# Patient Record
Sex: MALE | Race: ASIAN | NOT HISPANIC OR LATINO | URBAN - METROPOLITAN AREA
[De-identification: names, ages, dates, MRNs, and addresses within clinical notes are randomized per-mention and may not be internally consistent; named-entity substitution may affect disease eponyms.]

---

## 2023-10-10 ENCOUNTER — INPATIENT (INPATIENT)
Facility: HOSPITAL | Age: 63
LOS: 1 days | Discharge: HOME | DRG: 465 | End: 2023-10-12
Attending: INTERNAL MEDICINE | Admitting: INTERNAL MEDICINE
Payer: MEDICAID

## 2023-10-10 VITALS
TEMPERATURE: 98 F | HEIGHT: 68 IN | RESPIRATION RATE: 17 BRPM | DIASTOLIC BLOOD PRESSURE: 73 MMHG | SYSTOLIC BLOOD PRESSURE: 125 MMHG | WEIGHT: 169.98 LBS | OXYGEN SATURATION: 100 % | HEART RATE: 64 BPM

## 2023-10-10 DIAGNOSIS — T14.8XXA OTHER INJURY OF UNSPECIFIED BODY REGION, INITIAL ENCOUNTER: ICD-10-CM

## 2023-10-10 LAB
ALBUMIN SERPL ELPH-MCNC: 4.3 G/DL — SIGNIFICANT CHANGE UP (ref 3.3–5)
ALP SERPL-CCNC: 66 U/L — SIGNIFICANT CHANGE UP (ref 30–120)
ALT FLD-CCNC: 56 U/L — SIGNIFICANT CHANGE UP (ref 10–60)
ANION GAP SERPL CALC-SCNC: 10 MMOL/L — SIGNIFICANT CHANGE UP (ref 5–17)
APTT BLD: 35.1 SEC — SIGNIFICANT CHANGE UP (ref 24.5–35.6)
AST SERPL-CCNC: 35 U/L — SIGNIFICANT CHANGE UP (ref 10–40)
BASOPHILS # BLD AUTO: 0.01 K/UL — SIGNIFICANT CHANGE UP (ref 0–0.2)
BASOPHILS NFR BLD AUTO: 0.2 % — SIGNIFICANT CHANGE UP (ref 0–2)
BILIRUB SERPL-MCNC: 0.4 MG/DL — SIGNIFICANT CHANGE UP (ref 0.2–1.2)
BUN SERPL-MCNC: 22 MG/DL — SIGNIFICANT CHANGE UP (ref 7–23)
CALCIUM SERPL-MCNC: 9.5 MG/DL — SIGNIFICANT CHANGE UP (ref 8.4–10.5)
CHLORIDE SERPL-SCNC: 104 MMOL/L — SIGNIFICANT CHANGE UP (ref 96–108)
CO2 SERPL-SCNC: 28 MMOL/L — SIGNIFICANT CHANGE UP (ref 22–31)
CREAT SERPL-MCNC: 0.91 MG/DL — SIGNIFICANT CHANGE UP (ref 0.5–1.3)
EGFR: 96 ML/MIN/1.73M2 — SIGNIFICANT CHANGE UP
EOSINOPHIL # BLD AUTO: 0.04 K/UL — SIGNIFICANT CHANGE UP (ref 0–0.5)
EOSINOPHIL NFR BLD AUTO: 0.7 % — SIGNIFICANT CHANGE UP (ref 0–6)
GLUCOSE SERPL-MCNC: 119 MG/DL — HIGH (ref 70–99)
HCT VFR BLD CALC: 45.3 % — SIGNIFICANT CHANGE UP (ref 39–50)
HGB BLD-MCNC: 15.1 G/DL — SIGNIFICANT CHANGE UP (ref 13–17)
IMM GRANULOCYTES NFR BLD AUTO: 0.2 % — SIGNIFICANT CHANGE UP (ref 0–0.9)
INR BLD: 1.03 RATIO — SIGNIFICANT CHANGE UP (ref 0.85–1.18)
LYMPHOCYTES # BLD AUTO: 1.77 K/UL — SIGNIFICANT CHANGE UP (ref 1–3.3)
LYMPHOCYTES # BLD AUTO: 32.8 % — SIGNIFICANT CHANGE UP (ref 13–44)
MCHC RBC-ENTMCNC: 30.1 PG — SIGNIFICANT CHANGE UP (ref 27–34)
MCHC RBC-ENTMCNC: 33.3 GM/DL — SIGNIFICANT CHANGE UP (ref 32–36)
MCV RBC AUTO: 90.2 FL — SIGNIFICANT CHANGE UP (ref 80–100)
MONOCYTES # BLD AUTO: 0.23 K/UL — SIGNIFICANT CHANGE UP (ref 0–0.9)
MONOCYTES NFR BLD AUTO: 4.3 % — SIGNIFICANT CHANGE UP (ref 2–14)
NEUTROPHILS # BLD AUTO: 3.33 K/UL — SIGNIFICANT CHANGE UP (ref 1.8–7.4)
NEUTROPHILS NFR BLD AUTO: 61.8 % — SIGNIFICANT CHANGE UP (ref 43–77)
NRBC # BLD: 0 /100 WBCS — SIGNIFICANT CHANGE UP (ref 0–0)
PLATELET # BLD AUTO: 196 K/UL — SIGNIFICANT CHANGE UP (ref 150–400)
POTASSIUM SERPL-MCNC: 4 MMOL/L — SIGNIFICANT CHANGE UP (ref 3.5–5.3)
POTASSIUM SERPL-SCNC: 4 MMOL/L — SIGNIFICANT CHANGE UP (ref 3.5–5.3)
PROT SERPL-MCNC: 7.5 G/DL — SIGNIFICANT CHANGE UP (ref 6–8.3)
PROTHROM AB SERPL-ACNC: 11.5 SEC — SIGNIFICANT CHANGE UP (ref 9.5–13)
RBC # BLD: 5.02 M/UL — SIGNIFICANT CHANGE UP (ref 4.2–5.8)
RBC # FLD: 11.8 % — SIGNIFICANT CHANGE UP (ref 10.3–14.5)
SODIUM SERPL-SCNC: 142 MMOL/L — SIGNIFICANT CHANGE UP (ref 135–145)
WBC # BLD: 5.39 K/UL — SIGNIFICANT CHANGE UP (ref 3.8–10.5)
WBC # FLD AUTO: 5.39 K/UL — SIGNIFICANT CHANGE UP (ref 3.8–10.5)

## 2023-10-10 PROCEDURE — 99285 EMERGENCY DEPT VISIT HI MDM: CPT

## 2023-10-10 PROCEDURE — 93010 ELECTROCARDIOGRAM REPORT: CPT

## 2023-10-10 PROCEDURE — 71045 X-RAY EXAM CHEST 1 VIEW: CPT | Mod: 26

## 2023-10-10 PROCEDURE — 73110 X-RAY EXAM OF WRIST: CPT | Mod: 26,LT

## 2023-10-10 RX ORDER — INFLUENZA VIRUS VACCINE 15; 15; 15; 15 UG/.5ML; UG/.5ML; UG/.5ML; UG/.5ML
0.5 SUSPENSION INTRAMUSCULAR ONCE
Refills: 0 | Status: DISCONTINUED | OUTPATIENT
Start: 2023-10-10 | End: 2023-10-12

## 2023-10-10 RX ORDER — TETANUS TOXOID, REDUCED DIPHTHERIA TOXOID AND ACELLULAR PERTUSSIS VACCINE, ADSORBED 5; 2.5; 8; 8; 2.5 [IU]/.5ML; [IU]/.5ML; UG/.5ML; UG/.5ML; UG/.5ML
0.5 SUSPENSION INTRAMUSCULAR ONCE
Refills: 0 | Status: COMPLETED | OUTPATIENT
Start: 2023-10-10 | End: 2023-10-10

## 2023-10-10 RX ADMIN — Medication 600 MILLIGRAM(S): at 19:45

## 2023-10-10 RX ADMIN — TETANUS TOXOID, REDUCED DIPHTHERIA TOXOID AND ACELLULAR PERTUSSIS VACCINE, ADSORBED 0.5 MILLILITER(S): 5; 2.5; 8; 8; 2.5 SUSPENSION INTRAMUSCULAR at 19:15

## 2023-10-10 RX ADMIN — Medication 100 MILLIGRAM(S): at 19:15

## 2023-10-10 NOTE — ED ADULT NURSE NOTE - OBJECTIVE STATEMENT
Pt came ambulatory accompanied by his friend presented with laceration on his left wrist . Pt reported was installing cabinets when he accidentally cut his left wrist with a piece of plywood. Pt presented with 5 cm laceration. Pt wound  bleeding controlled with pressure dressing. Pt able to move all his 5 left fingers but reported that his decrease sensation on his 5th left digit. Pt speak Mandarin and requestesd his friend Edson Arriola as a  .

## 2023-10-10 NOTE — PATIENT PROFILE ADULT - NSPROPTRIGHTCAREGIVER_GEN_A_NUR
Hematology notified this RN that the INR was unable to be resulted. They stated they would do a redraw to confirm as this was originally a redraw to confirm a previous result of 20.0 for the INR. Eki ([facility] NP) came to bedside and was notified then and stated to notify her of the redraw.     Redraw was once again unable to be results. Eki ([facility] NP) was notified and Vitamin K was ordered. Repeat INR was ordered for the AM.     Will continue to monitor the patient.    no

## 2023-10-10 NOTE — ED PROVIDER NOTE - CLINICAL SUMMARY MEDICAL DECISION MAKING FREE TEXT BOX
Family/friend translating per patient request  Patient complaining of laceration to left wrist status post injured by a sharp piece of wood.  Patient denies weakness numbness or any other complaints.  Unknown last tetanus.    Plan update tetanus give IV antibiotics consult hand due to tendon involvement

## 2023-10-10 NOTE — ED ADULT TRIAGE NOTE - CHIEF COMPLAINT QUOTE
64 y/o male presents aox4 ambulatory c/o laceration to left wrist 2 hours ago with a wooden board at home. active bleeding noted.

## 2023-10-10 NOTE — ED ADULT NURSE NOTE - NURSING SKIN WOUND APPEAR #1
bleeding minimally Quality 431: Preventive Care And Screening: Unhealthy Alcohol Use - Screening: Patient screened for unhealthy alcohol use using a single question and scores less than 2 times per year Quality 128: Preventive Care And Screening: Body Mass Index (Bmi) Screening And Follow-Up Plan: BMI not documented, reason not otherwise specified. Quality 226: Preventive Care And Screening: Tobacco Use: Screening And Cessation Intervention: Patient screened for tobacco use and is an ex/non-smoker Quality 130: Documentation Of Current Medications In The Medical Record: Current Medications Documented Detail Level: Detailed

## 2023-10-10 NOTE — ED PROVIDER NOTE - NEUROLOGICAL, MLM
Alert and oriented, no focal deficits. cap refill < 2 sec. subjective mild numbness to left 5th digit, but has good 2 point discrim

## 2023-10-10 NOTE — ED ADULT NURSE NOTE - NSFALLUNIVINTERV_ED_ALL_ED
Bed/Stretcher in lowest position, wheels locked, appropriate side rails in place/Call bell, personal items and telephone in reach/Instruct patient to call for assistance before getting out of bed/chair/stretcher/Non-slip footwear applied when patient is off stretcher/[place] to call system/Physically safe environment - no spills, clutter or unnecessary equipment/Purposeful proactive rounding/Room/bathroom lighting operational, light cord in reach

## 2023-10-10 NOTE — ED PROVIDER NOTE - WR ORDER NAME 2
Xray Chest 1 View AP/PA Metronidazole Counseling:  I discussed with the patient the risks of metronidazole including but not limited to seizures, nausea/vomiting, a metallic taste in the mouth, nausea/vomiting and severe allergy.

## 2023-10-10 NOTE — ED PROVIDER NOTE - NS ED ATTENDING STATEMENT MOD
This was a shared visit with the LONNIE. I reviewed and verified the documentation and independently performed the documented:

## 2023-10-10 NOTE — PATIENT PROFILE ADULT - FALL HARM RISK - UNIVERSAL INTERVENTIONS
Bed in lowest position, wheels locked, appropriate side rails in place/Call bell, personal items and telephone in reach/Instruct patient to call for assistance before getting out of bed or chair/Non-slip footwear when patient is out of bed/[place] to call system/Physically safe environment - no spills, clutter or unnecessary equipment/Purposeful Proactive Rounding/Room/bathroom lighting operational, light cord in reach

## 2023-10-10 NOTE — ED PROVIDER NOTE - OBJECTIVE STATEMENT
Otherwise healthy 63-year-old male presents with left wrist laceration that occurred at home about 2 hours ago by a wooden board.  Patient states he was working on furniture and was hit by a wooden board.  Has a 5 cm laceration to left wrist.  States pain mild in nature.  Reports slight tingling to left fifth digit compared to right but is still able to feel everything.  Patient reports full range of motion.  Denies any bony tenderness.  Unsure of tetanus status.  Right-handed.  Denies other injuries or complaints  PCP Li

## 2023-10-10 NOTE — ED PROVIDER NOTE - DIFFERENTIAL DIAGNOSIS
Differentials include but not limited to laceration, tendon injury, neurovascular injury, fracture Differential Diagnosis

## 2023-10-11 LAB — BLD GP AB SCN SERPL QL: SIGNIFICANT CHANGE UP

## 2023-10-11 PROCEDURE — 88304 TISSUE EXAM BY PATHOLOGIST: CPT | Mod: 26

## 2023-10-11 RX ORDER — SODIUM CHLORIDE 9 MG/ML
1000 INJECTION, SOLUTION INTRAVENOUS
Refills: 0 | Status: DISCONTINUED | OUTPATIENT
Start: 2023-10-11 | End: 2023-10-12

## 2023-10-11 RX ORDER — HYDROMORPHONE HYDROCHLORIDE 2 MG/ML
0.5 INJECTION INTRAMUSCULAR; INTRAVENOUS; SUBCUTANEOUS
Refills: 0 | Status: DISCONTINUED | OUTPATIENT
Start: 2023-10-11 | End: 2023-10-11

## 2023-10-11 RX ORDER — OXYCODONE HYDROCHLORIDE 5 MG/1
5 TABLET ORAL ONCE
Refills: 0 | Status: DISCONTINUED | OUTPATIENT
Start: 2023-10-11 | End: 2023-10-11

## 2023-10-11 RX ORDER — SODIUM CHLORIDE 9 MG/ML
1000 INJECTION, SOLUTION INTRAVENOUS
Refills: 0 | Status: DISCONTINUED | OUTPATIENT
Start: 2023-10-11 | End: 2023-10-11

## 2023-10-11 RX ORDER — ONDANSETRON 8 MG/1
4 TABLET, FILM COATED ORAL ONCE
Refills: 0 | Status: DISCONTINUED | OUTPATIENT
Start: 2023-10-11 | End: 2023-10-11

## 2023-10-11 RX ORDER — OXYCODONE AND ACETAMINOPHEN 5; 325 MG/1; MG/1
1 TABLET ORAL EVERY 4 HOURS
Refills: 0 | Status: DISCONTINUED | OUTPATIENT
Start: 2023-10-11 | End: 2023-10-12

## 2023-10-11 RX ADMIN — Medication 100 MILLIGRAM(S): at 00:07

## 2023-10-11 RX ADMIN — Medication 100 MILLIGRAM(S): at 21:11

## 2023-10-11 RX ADMIN — Medication 100 MILLIGRAM(S): at 05:54

## 2023-10-11 NOTE — H&P ADULT - HISTORY OF PRESENT ILLNESS
63-year-old male presents with left wrist laceration that occurred at home about 2 hours ago by a wooden board.  Patient states he was working on furniture and was hit by a wooden board.  Has a 5 cm laceration to left wrist.  States pain mild in nature.  Reports slight tingling to left fifth digit compared to right but is still able to feel everything.  Patient reports full range of motion.  Denies any bony tenderness.  Unsure of tetanus status.  Right-handed.  Denies other injuries or complaints    History obtained from patients friend Edson

## 2023-10-11 NOTE — PATIENT CHOICE NOTE. - NSPTCHOICESTATE_GEN_ALL_CORE

## 2023-10-11 NOTE — CARE COORDINATION ASSESSMENT. - NSCAREPROVIDERS_GEN_ALL_CORE_FT
CARE PROVIDERS:  Accepting Physician: Sirisha Nolasco  Admitting: Sirisha Nolasco  Attending: Sirisha Nolasco  Case Management: Jennifer Kern  Case Management: Santaromana, Anna  Consultant: Yessy Alexis  Consultant: Keyla Patel  Consultant: Weil, Patricia ED ACP: Shea Aguilar  ED Attending: Yony Apple ED Nurse: Jerman Bach  Nurse: Penny Anne  Nurse: Brisa Ballard  Nurse: Maureen Yusuf  Nurse: Felicitas Butcher  Nurse: Rachel Méndez  Nurse: Marianela Prather  Nurse: Misa Stout  Ordered: ServiceAccount, SCMMLM  Override: Penny Anne  Override: Krystal Triana  PCA/Nursing Assistant: Lorri Awad  Primary Team: Tori Mcwilliams  Primary Team: Lawanda Bahena  Primary Team: Teetee Obrien  Registered Dietitian: Samir Ornelas  : Dunia Benavides// Supp. Assoc.: Anamika Saldana

## 2023-10-11 NOTE — H&P ADULT - ASSESSMENT
63-year-old male presents with left wrist laceration that occurred at home about 2 hours ago by a wooden board.  Patient states he was working on furniture and was hit by a wooden board.  Has a 5 cm laceration to left wrist.  States pain mild in nature.  Reports slight tingling to left fifth digit compared to right but is still able to feel everything.  Patient reports full range of motion.  Denies any bony tenderness.  Unsure of tetanus status.  Right-handed.  Denies other injuries or complaints    Left wrist Laceration Plna for OR today   Patient with no medical problems, no cardiac history medically optimized for the procedure.

## 2023-10-11 NOTE — CARE COORDINATION ASSESSMENT. - OTHER PERTINENT DISCHARGE PLANNING INFORMATION:
RN FEDERICA met with pt. is A&O x4; patient speaks only mandarin and has deferred his assessment to his brother MR. CRISTHIAN TENORIO [phone]; patient is employed full-time; independent in ambulation w/o assistive devices prior to hospitalization; ADLs & IADLs, CM role and DC planning process explained; verbalized understanding.  As per, brother CRISTHIAN TENORIO, patient lives with him; and family will assume services;  No need for skilled services identified at time of assessment; patient & brother in agreement; Patient has PCP in the community per brother;   TRANSITION OF CARE PLAN  NO DME needs; DC to home; self care; family to assume care; No skilled services needed at time of assessment; patient and spouse in agreement; DC to home; spouse to assume care; will f/u with MD post DC; will transport patient home.

## 2023-10-11 NOTE — H&P ADULT - NSHPROSALLOTHERNEGRD_GEN_ALL_CORE
No weight bearing on left arm until seen by orthopedic surgeon in 2 weeks All other review of systems negative, except as noted in HPI

## 2023-10-11 NOTE — PATIENT CHOICE NOTE. - NSPTCHOICENOTES_GEN_ALL_CORE
TRANSITION OF CARE PLAN  NO DME needs; DC to home; self care; family to assume care; No skilled services needed at time of assessment; patient and spouse in agreement; DC to home; spouse to assume care; will f/u with MD post DC; will transport patient home.

## 2023-10-12 VITALS
OXYGEN SATURATION: 99 % | DIASTOLIC BLOOD PRESSURE: 66 MMHG | HEART RATE: 65 BPM | TEMPERATURE: 98 F | SYSTOLIC BLOOD PRESSURE: 113 MMHG | RESPIRATION RATE: 16 BRPM

## 2023-10-12 PROCEDURE — 85730 THROMBOPLASTIN TIME PARTIAL: CPT

## 2023-10-12 PROCEDURE — T1013: CPT

## 2023-10-12 PROCEDURE — 90715 TDAP VACCINE 7 YRS/> IM: CPT

## 2023-10-12 PROCEDURE — 36415 COLL VENOUS BLD VENIPUNCTURE: CPT

## 2023-10-12 PROCEDURE — 96374 THER/PROPH/DIAG INJ IV PUSH: CPT

## 2023-10-12 PROCEDURE — 85610 PROTHROMBIN TIME: CPT

## 2023-10-12 PROCEDURE — 93005 ELECTROCARDIOGRAM TRACING: CPT

## 2023-10-12 PROCEDURE — 88304 TISSUE EXAM BY PATHOLOGIST: CPT

## 2023-10-12 PROCEDURE — 86901 BLOOD TYPING SEROLOGIC RH(D): CPT

## 2023-10-12 PROCEDURE — 86850 RBC ANTIBODY SCREEN: CPT

## 2023-10-12 PROCEDURE — 85025 COMPLETE CBC W/AUTO DIFF WBC: CPT

## 2023-10-12 PROCEDURE — 86900 BLOOD TYPING SEROLOGIC ABO: CPT

## 2023-10-12 PROCEDURE — 86803 HEPATITIS C AB TEST: CPT

## 2023-10-12 PROCEDURE — 99285 EMERGENCY DEPT VISIT HI MDM: CPT

## 2023-10-12 PROCEDURE — 71045 X-RAY EXAM CHEST 1 VIEW: CPT

## 2023-10-12 PROCEDURE — 73110 X-RAY EXAM OF WRIST: CPT

## 2023-10-12 PROCEDURE — 80053 COMPREHEN METABOLIC PANEL: CPT

## 2023-10-12 RX ORDER — OXYCODONE AND ACETAMINOPHEN 5; 325 MG/1; MG/1
1 TABLET ORAL
Qty: 0 | Refills: 0 | DISCHARGE
Start: 2023-10-12

## 2023-10-12 RX ADMIN — Medication 100 MILLIGRAM(S): at 05:14

## 2023-10-12 NOTE — DISCHARGE NOTE NURSING/CASE MANAGEMENT/SOCIAL WORK - PATIENT PORTAL LINK FT
You can access the FollowMyHealth Patient Portal offered by [facility] by registering at the following website: [email or web address]. By joining [facility]’s FollowMyHealth portal, you will also be able to view your health information using other applications (apps) compatible with our system.

## 2023-10-12 NOTE — DISCHARGE NOTE PROVIDER - NSDCMRMEDTOKEN_GEN_ALL_CORE_FT
clindamycin 300 mg oral capsule: 2 cap(s) orally every 8 hours  oxycodone-acetaminophen 5 mg-325 mg oral tablet: 1 tab(s) orally every 4 hours As needed Severe Pain (7 - 10)

## 2023-10-12 NOTE — DISCHARGE NOTE NURSING/CASE MANAGEMENT/SOCIAL WORK - NSDCPEFALRISK_GEN_ALL_CORE
For information on Fall & Injury Prevention, visit: [email or web address] OR  [email or web address] OR  https://www.cdc.gov/steadi/patient.html

## 2023-10-12 NOTE — DISCHARGE NOTE PROVIDER - HOSPITAL COURSE
63-year-old male presents with left wrist laceration that occurred at home about 2 hours ago by a wooden board.  Patient states he was working on furniture and was hit by a wooden board.  Has a 5 cm laceration to left wrist.  States pain mild in nature.  Reports slight tingling to left fifth digit compared to right but is still able to feel everything.  Patient reports full range of motion.  Denies any bony tenderness.  Unsure of tetanus status.  Right-handed.  Denies other injuries or complaints    s/p surgery   ABX as prescribed   Follow up with DR Joy   D/C planning

## 2023-10-12 NOTE — PROGRESS NOTE ADULT - SUBJECTIVE AND OBJECTIVE BOX
Addendum  Pt allergic to PCMN.  Continue IV Clinda, 900mg, Q6hr/NPO after midnight for repairing right wrist multiple penetrating injuries.
Options/alts/risks/cxs discussed via .  Pt understands and agrees to proceed.
SURGERY PA NOTE ON BEHALF OF DR. Alexis / Plastic  SURGERY:    S: Patient seen and examined at bedside via Mandarin  ID # [id number].     Pain well controlled on oral regimen.   Tolerating diet without n/v.   Ambulating, voiding.   Denies fever/chills.   Denies paresthesia or focal weakness in LUE.     MEDICATIONS:  clindamycin IVPB 900 milliGRAM(s) IV Intermittent every 8 hours  influenza   Vaccine 0.5 milliLiter(s) IntraMuscular once  lactated ringers. 1000 milliLiter(s) IV Continuous <Continuous>  oxycodone    5 mG/acetaminophen 325 mG 1 Tablet(s) Oral every 4 hours PRN      O:  Vital Signs Last 24 Hrs  T(C): 36.5 (12 Oct 2023 07:45), Max: 36.7 (11 Oct 2023 23:30)  T(F): 97.7 (12 Oct 2023 07:45), Max: 98 (11 Oct 2023 23:30)  HR: 65 (12 Oct 2023 07:45) (64 - 79)  BP: 113/66 (12 Oct 2023 07:45) (100/61 - 129/68)  BP(mean): --  RR: 16 (12 Oct 2023 07:45) (10 - 16)  SpO2: 99% (12 Oct 2023 07:45) (96% - 100%)    Parameters below as of 12 Oct 2023 07:45  Patient On (Oxygen Delivery Method): room air        I&O SUMMARY:    10-11-23 @ 07:01  -  10-12-23 @ 07:00  --------------------------------------------------------  IN: 600 mL / OUT: 400 mL / NET: 200 mL        PHYSICAL EXAM:  Lungs: CTA bilat without W/R/R  Card: S1S2  Abd: Soft, NT, ND.  +BS x 4.  No rebound/guarding.    Ext: Calves soft, NT, without edema bilat.   LUE: in dressing, clean, dry and intact. able to move all fingers. Good capillary refill < 2 seconds. Sensory intact. Neurovascular intact. Warm, dry skin. slightly edematous, which is expected. Left arm in sling.     LABS:                        15.1   5.39  )-----------( 196      ( 10 Oct 2023 19:31 )             45.3     10-10    142  |  104  |  22  ----------------------------<  119<H>  4.0   |  28  |  0.91    Ca    9.5      10 Oct 2023 19:31    TPro  7.5  /  Alb  4.3  /  TBili  0.4  /  DBili  x   /  AST  35  /  ALT  56  /  AlkPhos  66  10-10    PT/INR - ( 10 Oct 2023 19:31 )   PT: 11.5 sec;   INR: 1.03 ratio         PTT - ( 10 Oct 2023 19:31 )  PTT:35.1 sec          RADIOLOGY:  < from: Xray Chest 1 View AP/PA (10.10.23 @ 19:37) >    INTERPRETATION:  Clinical history: 63-year-old male, laceration.    Three views of the left wrist without comparison.    Mild degenerative change with no fracture, dislocation or radiopaque   foreign body.    Laceration volar forearm evident on the lateral view.    Normal cardiac silhouette and normal pulmonary vasculature with no   consolidation, effusion, pneumothorax or acute osseous finding.    IMPRESSION:  Laceration of the volar forearm with no radiopaque foreign body.    Negative chest    --- End of Report ---    < end of copied text >      A: 64 yo male POD#1 s/p repair of left wrist injuries (debrided tendon).   Recovering well. no acute issues.   LUE, neurovascular intact. good capillary refill < 2 seconds, sensory/motor intact.   Dianna paresthesia or focal weakness.   Afebrile, hemodynamically stable.       P:  - Stable for discharge from Plastic Surgery standpoint.   - Recommends Clindamycin for 10 days after discharge  - Recommends Tramadol 6 tablets for pain control after discharge  - Keep the splint clean, dry and intact until follow up visit to Dr. Alexis  - Elevate the affected limb to avoid excessive swelling.   - Return precautions: loss of sensation, severe worsening pain although adequate pain med, skin color changes to blue/purple etc.   - follow up with Dr. Alexis in 10 days for postop followup  - Case and plan discussed with Dr. Alexis

## 2023-10-12 NOTE — DISCHARGE NOTE NURSING/CASE MANAGEMENT/SOCIAL WORK - NSDCVIVACCINE_GEN_ALL_CORE_FT
Tdap; 10-Oct-2023 19:15; Jerman Bach (RN); [employer]; [id number] (Exp. Date: 05-Oct-2025); IntraMuscular; Deltoid Right.; 0.5 milliLiter(s); VIS (VIS Published: 09-May-2013, VIS Presented: 10-Oct-2023);

## 2023-10-12 NOTE — DISCHARGE NOTE PROVIDER - CARE PROVIDER_API CALL
Yessy Alexis  Plastic Surgery  [address] [place] [address], [address]  [place], [place] [id number]  Phone: [phone]  Fax: [phone]  Follow Up Time:

## (undated) DEVICE — GLV 7.5 PROTEXIS (WHITE)

## (undated) DEVICE — GLV 7 PROTEXIS (WHITE)

## (undated) DEVICE — PACK EXTREMITY

## (undated) DEVICE — DRSG STERISTRIPS 0.5 X 4"

## (undated) DEVICE — DRAPE XRAY CASSETTE COVER DOUBLE 20X40"

## (undated) DEVICE — ELCTR BIPOLAR CORD J&J 12FT DISP

## (undated) DEVICE — DRAPE MAYO STAND 30"

## (undated) DEVICE — TOURNIQUET ESMARK 4"

## (undated) DEVICE — SOL IRR POUR NS 0.9% 500ML

## (undated) DEVICE — BLADE SCALPEL SAFETYLOCK #15

## (undated) DEVICE — DRAPE TOWEL BLUE 17" X 24"

## (undated) DEVICE — GOWN TRIMAX LG

## (undated) DEVICE — POSITIONER FOAM EGG CRATE ULNAR 2PCS (PINK)

## (undated) DEVICE — DRSG STOCKINETTE IMPERVIOUS MED

## (undated) DEVICE — VISITEC 4X4

## (undated) DEVICE — MEDICATION LABELS W MARKER

## (undated) DEVICE — GLV 8 PROTEXIS (WHITE)

## (undated) DEVICE — DRSG STOCKINETTE TUBULAR SYNTHETIC 1PLY 3X36"

## (undated) DEVICE — WARMING BLANKET LOWER ADULT

## (undated) DEVICE — SOL IRR POUR H2O 250ML

## (undated) DEVICE — SLING ARM CHIEFTAIN MESH MEDIUM

## (undated) DEVICE — GLV 6.5 PROTEXIS (WHITE)

## (undated) DEVICE — VENODYNE/SCD SLEEVE CALF LARGE

## (undated) DEVICE — SPECIMEN CONTAINER 100ML

## (undated) DEVICE — TOURNIQUET CUFF 24" DUAL PORT SINGLE BLADDER LUER LOCK  (BLACK)